# Patient Record
Sex: FEMALE | Race: WHITE | NOT HISPANIC OR LATINO | URBAN - METROPOLITAN AREA
[De-identification: names, ages, dates, MRNs, and addresses within clinical notes are randomized per-mention and may not be internally consistent; named-entity substitution may affect disease eponyms.]

---

## 2018-05-18 ENCOUNTER — EMERGENCY (EMERGENCY)
Facility: HOSPITAL | Age: 33
LOS: 1 days | Discharge: HOME | End: 2018-05-18
Attending: EMERGENCY MEDICINE | Admitting: EMERGENCY MEDICINE
Payer: COMMERCIAL

## 2018-05-18 VITALS — HEIGHT: 61 IN | WEIGHT: 104.94 LBS

## 2018-05-18 VITALS
OXYGEN SATURATION: 97 % | DIASTOLIC BLOOD PRESSURE: 75 MMHG | SYSTOLIC BLOOD PRESSURE: 113 MMHG | TEMPERATURE: 98 F | RESPIRATION RATE: 16 BRPM | HEART RATE: 71 BPM

## 2018-05-18 PROCEDURE — 99284 EMERGENCY DEPT VISIT MOD MDM: CPT

## 2018-05-18 RX ORDER — METRONIDAZOLE 7.5 MG/G
1 GEL VAGINAL
Qty: 1 | Refills: 0 | OUTPATIENT
Start: 2018-05-18 | End: 2018-05-22

## 2018-05-18 NOTE — ED PROVIDER NOTE - CARE PLAN
Principal Discharge DX:	Bacterial vaginosis  Secondary Diagnosis:	Retained tampon, initial encounter

## 2018-05-18 NOTE — ED PROVIDER NOTE - OBJECTIVE STATEMENT
34 y/o F presents with 21/2 weeks of strong vaginal odor, with white discharge.  Her LMP was 5/8.  Denies dysuria, hematuria, vaginal lesions.

## 2018-05-18 NOTE — ED PROVIDER NOTE - ATTENDING CONTRIBUTION TO CARE
32 y/o Female seen and evaluated with ACP  Presents to ED for vaginal odor--malodorous c 2-3 weeks  notes associated discharge  no fever, chills  tolerates po  vitals reviewed, exam as documented  flagyl for BV, f/u as instructed
